# Patient Record
Sex: MALE | Race: WHITE | NOT HISPANIC OR LATINO | URBAN - METROPOLITAN AREA
[De-identification: names, ages, dates, MRNs, and addresses within clinical notes are randomized per-mention and may not be internally consistent; named-entity substitution may affect disease eponyms.]

---

## 2017-01-05 ENCOUNTER — APPOINTMENT (RX ONLY)
Dept: URBAN - METROPOLITAN AREA CLINIC 109 | Facility: CLINIC | Age: 82
Setting detail: DERMATOLOGY
End: 2017-01-05

## 2017-01-05 DIAGNOSIS — L29.89 OTHER PRURITUS: ICD-10-CM

## 2017-01-05 DIAGNOSIS — L29.8 OTHER PRURITUS: ICD-10-CM

## 2017-01-05 DIAGNOSIS — L85.3 XEROSIS CUTIS: ICD-10-CM

## 2017-01-05 PROBLEM — J30.1 ALLERGIC RHINITIS DUE TO POLLEN: Status: ACTIVE | Noted: 2017-01-05

## 2017-01-05 PROBLEM — L55.1 SUNBURN OF SECOND DEGREE: Status: ACTIVE | Noted: 2017-01-05

## 2017-01-05 PROCEDURE — ? COUNSELING

## 2017-01-05 PROCEDURE — 99213 OFFICE O/P EST LOW 20 MIN: CPT

## 2017-01-05 PROCEDURE — ? PRESCRIPTION

## 2017-01-05 PROCEDURE — ? TREATMENT REGIMEN

## 2017-01-05 RX ORDER — GABAPENTIN 300 MG/1
CAPSULE ORAL QHS
Qty: 30 | Refills: 0 | Status: ERX | COMMUNITY
Start: 2017-01-05

## 2017-01-05 RX ADMIN — GABAPENTIN: 300 CAPSULE ORAL at 17:09

## 2017-01-05 ASSESSMENT — ITCH INTENSITY: HOW SEVERE IS YOUR ITCHING?: 10

## 2017-01-05 ASSESSMENT — LOCATION ZONE DERM: LOCATION ZONE: ARM

## 2017-01-05 ASSESSMENT — LOCATION SIMPLE DESCRIPTION DERM
LOCATION SIMPLE: LEFT FOREARM
LOCATION SIMPLE: RIGHT ELBOW

## 2017-01-05 ASSESSMENT — LOCATION DETAILED DESCRIPTION DERM
LOCATION DETAILED: RIGHT ELBOW
LOCATION DETAILED: LEFT PROXIMAL DORSAL FOREARM

## 2017-01-05 NOTE — PROCEDURE: TREATMENT REGIMEN
Plan: Pt will call with update in a week \\Madhu. Beau will send copy of bloodwork
Detail Level: Simple
Initiate Treatment: Gabapentin 300mg qHS

## 2017-01-13 RX ORDER — GABAPENTIN 300 MG/1
CAPSULE ORAL QHS
Qty: 30 | Refills: 0 | Status: ERX